# Patient Record
Sex: FEMALE | Race: WHITE
[De-identification: names, ages, dates, MRNs, and addresses within clinical notes are randomized per-mention and may not be internally consistent; named-entity substitution may affect disease eponyms.]

---

## 2019-07-20 ENCOUNTER — HOSPITAL ENCOUNTER (EMERGENCY)
Dept: HOSPITAL 56 - MW.ED | Age: 24
LOS: 1 days | Discharge: LEFT BEFORE DISCHARGE | End: 2019-07-21
Payer: SELF-PAY

## 2019-07-20 DIAGNOSIS — Z53.20: ICD-10-CM

## 2019-07-20 DIAGNOSIS — J45.901: Primary | ICD-10-CM

## 2019-07-20 NOTE — EDM.PDOC
ED HPI GENERAL MEDICAL PROBLEM





- General


Stated Complaint: ASTHMA ATTACK


Time Seen by Provider: 07/20/19 23:04





- History of Present Illness


INITIAL COMMENTS - FREE TEXT/NARRATIVE: 


HISTORY AND PHYSICAL:





History of present illness:


Patient's 23-year-old female with history of asthma presents with concern 

shortness of breath and chest pain she is her inhaler prior to arrival she is 

still a smoker she denies fever chills nausea vomiting trauma or other concern.





Review of systems: 


As per history of present illness and below otherwise all systems reviewed and 

negative.





Past medical history: 


As per history of present illness and as reviewed below otherwise 

noncontributory.





Surgical history: 


As per history of present illness and as reviewed below otherwise 

noncontributory.





Social history: 


No reported history of drug or alcohol abuse.





Family history: 


As per history of present illness and as reviewed below otherwise 

noncontributory.





Physical exam:


HEENT: Atraumatic, normocephalic, pupils reactive, negative for conjunctival 

pallor or scleral icterus, mucous membranes moist, throat clear, neck supple, 

nontender, trachea midline.


Lungs: Slightly coarse and diminished rare end-expiratory wheezing noted, 

breath sounds equal bilaterally, chest nontender.


Heart: S1S2, regular, negative for clicks, rubs, or JVD.


Abdomen: Soft, nondistended, nontender. Negative for masses or 

hepatosplenomegaly. Negative for costovertebral tenderness.


Pelvis: Stable nontender.


Genitourinary: Deferred.


Rectal: Deferred.


Extremities: Atraumatic, negative for cords or calf pain. Neurovascular 

unremarkable.


Neuro: Awake, alert, oriented. Cranial nerves II through XII unremarkable. 

Cerebellum unremarkable. Motor and sensory unremarkable throughout. Exam 

nonfocal.





Diagnostics:


Chest x-ray EKG





Therapeutics:


Albuterol ipratropium neb Solu-Medrol 125 mg IM





Impression: 


#1 acute asthmatic exacerbation





Definitive disposition and diagnosis as appropriate pending reevaluation and 

review of above.








ED ROS GENERAL





- Review of Systems


Review Of Systems: ROS reveals no pertinent complaints other than HPI.





ED EXAM, GENERAL





- Physical Exam


Exam: See Below (See dictation)





Course





- Orders/Labs/Meds


Orders: 


 Active Orders 24 hr











 Category Date Time Status


 


 EKG Documentation Completion [RC] STAT Care  07/20/19 23:04 Active


 


 RT Aerosol Therapy [RC] ASDIRECTED Care  07/20/19 23:03 Active


 


 Chest 1V Frontal [CR] Stat Exams  07/20/19 23:05 Ordered











Meds: 


Medications














Discontinued Medications














Generic Name Dose Route Start Last Admin





  Trade Name Jabari  PRN Reason Stop Dose Admin


 


Albuterol/Ipratropium  3 ml  07/20/19 23:03  





  Duoneb 3.0-0.5 Mg/3 Ml  NEB  07/20/19 23:04  





  ONETIME ONE   





     





     





     





     














Departure





- Departure


Time of Disposition: 00:11


Disposition: Against Medical Advice 07


Condition: Good


Clinical Impression: 


 Exacerbation of asthma








- Discharge Information





- My Orders


Last 24 Hours: 


My Active Orders





07/20/19 23:04


EKG Documentation Completion [RC] STAT 





07/20/19 23:05


Chest 1V Frontal [CR] Stat 














- Assessment/Plan


Last 24 Hours: 


My Active Orders





07/20/19 23:04


EKG Documentation Completion [RC] STAT 





07/20/19 23:05


Chest 1V Frontal [CR] Stat